# Patient Record
Sex: FEMALE | Race: WHITE | Employment: FULL TIME | URBAN - METROPOLITAN AREA
[De-identification: names, ages, dates, MRNs, and addresses within clinical notes are randomized per-mention and may not be internally consistent; named-entity substitution may affect disease eponyms.]

---

## 2017-05-17 ENCOUNTER — HOSPITAL ENCOUNTER (EMERGENCY)
Facility: HOSPITAL | Age: 47
Discharge: HOME | End: 2017-05-17
Attending: EMERGENCY MEDICINE
Payer: COMMERCIAL

## 2017-05-17 VITALS
BODY MASS INDEX: 23.9 KG/M2 | OXYGEN SATURATION: 97 % | WEIGHT: 140 LBS | SYSTOLIC BLOOD PRESSURE: 148 MMHG | TEMPERATURE: 98 F | HEART RATE: 67 BPM | DIASTOLIC BLOOD PRESSURE: 108 MMHG | RESPIRATION RATE: 16 BRPM | HEIGHT: 64 IN

## 2017-05-17 DIAGNOSIS — I10 ESSENTIAL HYPERTENSION: Primary | ICD-10-CM

## 2017-05-17 PROCEDURE — 99282 EMERGENCY DEPT VISIT SF MDM: CPT

## 2017-05-17 PROCEDURE — 99283 EMERGENCY DEPT VISIT LOW MDM: CPT

## 2017-05-17 RX ORDER — METOPROLOL SUCCINATE 25 MG/1
25 TABLET, EXTENDED RELEASE ORAL DAILY
COMMUNITY
End: 2019-01-03

## 2017-05-17 NOTE — ED INITIAL ASSESSMENT (HPI)
Patient reports she had dental work done 3 day ago, noted high BP at that time. Reports she has been checking it since and remains high  Patient has a hx of htn, is on metoprolol daily, yesterday and today the patient took her medication twice a day.   Carine Obrien

## 2017-05-18 NOTE — ED PROVIDER NOTES
Patient Seen in: [facility] Emergency Department    History   Patient presents with:  Hypertension (cardiovascular)    Stated Complaint: htn    HPI    46-year-old female with history of anxiety, hypertension presents to the emergency department for TABS,  1 TABLET DAILY   HYDROcodone-acetaminophen (NORCO) 5-325 MG Oral Tab,  Take 1-2 tablets by mouth every 4 (four) hours as needed.    Desoximetasone 0.25 % Apply Externally Cream,  Apply to AA of body BID x 2 weeks then PRN       No family history on f touch.  Good skin turgor. No lacerations, abrasions, lesions noted. ED Course   Labs Reviewed - No data to display  Patient clinically appears well. Her blood pressure currently is 730M systolic.   I have advised her to follow-up with her primary care

## 2020-04-07 ENCOUNTER — TELEMEDICINE (OUTPATIENT)
Dept: TELEHEALTH | Age: 50
End: 2020-04-07

## 2020-04-07 DIAGNOSIS — B34.9 VIRAL SYNDROME: Primary | ICD-10-CM

## 2020-04-07 PROCEDURE — 99213 OFFICE O/P EST LOW 20 MIN: CPT | Performed by: FAMILY MEDICINE

## 2020-04-07 NOTE — PROGRESS NOTES
HPI:    Patient ID: Lurdes Novoa is a 54year old female. HPI     About 10 days ago sore throat then started fever bodyaches, headaches and very tired. Beena Seymour   [facility]  She still has a fever but not sure how much   pa discussed about temporarily stopping lisinopril and starting some new medication. toprol sent in    No orders of the defined types were placed in this encounter.       Meds This Visit:  Requested Prescriptions      No prescriptions requested or ordered in t

## 2020-11-27 RX ORDER — METOPROLOL SUCCINATE 50 MG/1
50 TABLET, EXTENDED RELEASE ORAL DAILY
Qty: 20 TABLET | Refills: 0 | Status: SHIPPED | OUTPATIENT
Start: 2020-11-27 | End: 2021-11-22

## 2023-01-19 ENCOUNTER — HOSPITAL ENCOUNTER (OUTPATIENT)
Dept: CT IMAGING | Age: 53
Discharge: HOME | End: 2023-01-19
Attending: INTERNAL MEDICINE

## 2023-01-19 DIAGNOSIS — Z13.6 SCREENING FOR HEART DISEASE: ICD-10-CM

## 2023-01-19 LAB
POCT GLUCOSE CHOLESTECH: 91 (ref 70–99)
POCT HDL: 64 (ref 55–80)
POCT LDL: 167 (ref 0–99)
POCT TOTAL CHOLESTEROL: 259 (ref 110–200)
POCT TRIGLYCERIDES: 142 (ref 1–149)

## 2023-01-19 NOTE — PROGRESS NOTES
Date of Service 1/19/2023    Sergio Moore  Date of Birth 8/21/1970    Patient Age: 50year old    PCP: Kaleb [address]  [address] [place] [id number]    Consult Type  Type Scan/Screening: Heart Scan  Preliminary Heart Scan Score: 0                Body Mass Index  There is no height or weight on file to calculate BMI. Lipid Profile  No Lipid results on file in last 5 years . Nurse Review  Risk factor information and results reviewed with Nurse: Yes    Recommended Follow Up:  Consult your physician regarding[de-identified] Final Heart Scan Report; Discuss potential for Incidental Finding    No data recorded      Recommendations for Change:  Nutrition Changes: Low Saturated Fat;Low Fat Dairy; Increase Fiber  Cholesterol Modification (goal of therapy depends upon your risk): Decrease LDL (Lousy/Bad) Ideal <100  Exercise: Enhance Current Program  Smoking Cessation: No Change Needed  Weight Management: Decrease Current Weight  Stress Management: Adopt Stress Management Techniques  Repeat Heart Scan: 5 years if Calcium Score is 0. 0; Discuss with your Physician          Kin Recommended Resources:  Recommended Resources: Upcoming Classes, Medical Services and Health Library www. Health. Lizzy Coker RN        Please Contact the Nurse Heart Line with any Questions or Concerns [phone].

## 2023-01-19 NOTE — PROGRESS NOTES
Date of Service 1/19/2023    Marizol Granger  Date of Birth 8/21/1970    Patient Age: 50year old    PCP: [address]  [place] [id number]    Consult Type  Type Scan/Screening: Heart Scan  Preliminary Heart Scan Score: 0                Body Mass Index  There is no height or weight on file to calculate BMI. Lipid Profile  Cholesterol: 259, done on 1/19/2023. HDL Cholesterol: 64, done on 1/19/2023. LDL Cholesterol: 167, done on 1/19/2023. TriGlycerides 142, done on 1/19/2023. Not on cholesterol med. Nurse Review  Risk factor information and results reviewed with Nurse: Yes     She is on 3 BP meds. Strong family history of CAD. Both parents had Bypass surgery in their 52's and father has had multiple stents. Younger brother, no heart history. Recommended Follow Up:  Consult your physician regarding[de-identified] Final Heart Scan Report; Discuss potential for Incidental Finding    No data recorded      Recommendations for Change:  Nutrition Changes: Low Saturated Fat;Low Fat Dairy; Increase Fiber  Cholesterol Modification (goal of therapy depends upon your risk): Decrease LDL (Lousy/Bad) Ideal <100  Exercise: Enhance Current Program  Smoking Cessation: No Change Needed  Weight Management: Decrease Current Weight  Stress Management: Adopt Stress Management Techniques  Repeat Heart Scan: 5 years if Calcium Score is 0. 0; Discuss with your Physician          Kin Recommended Resources:  Recommended Resources: Upcoming Classes, Medical Services and Health Library www. "[email or web address]"Health. Steven Pelletier RN        Please Contact the Nurse Heart Line with any Questions or Concerns [phone].

## 2023-01-19 NOTE — PROGRESS NOTES
Date of Service 1/19/2023    Pedro Yu  Date of Birth 8/21/1970    Patient Age: 50year old    PCP: [address]  [address] [place] [id number]    Consult Type  Type Scan/Screening: Heart Scan  Preliminary Heart Scan Score: 0                Body Mass Index  There is no height or weight on file to calculate BMI. Lipid Profile  No Lipid results on file in last 5 years . Today      Nurse Review  Risk factor information and results reviewed with Nurse: Yes    Recommended Follow Up:  Consult your physician regarding[de-identified] Final Heart Scan Report; Discuss potential for Incidental Finding    No data recorded      Recommendations for Change:  Nutrition Changes: Low Saturated Fat;Low Fat Dairy; Increase Fiber  Cholesterol Modification (goal of therapy depends upon your risk): Decrease LDL (Lousy/Bad) Ideal <100  Exercise: Enhance Current Program  Smoking Cessation: No Change Needed  Weight Management: Decrease Current Weight  Stress Management: Adopt Stress Management Techniques  Repeat Heart Scan: 5 years if Calcium Score is 0. 0; Discuss with your Physician          Kin Recommended Resources:  Recommended Resources: Upcoming Classes, Medical Services and Health Library www. [employer]. Eldon Maloney RN        Please Contact the Nurse Heart Line with any Questions or Concerns [phone].

## 2024-08-11 ENCOUNTER — HOSPITAL ENCOUNTER (OUTPATIENT)
Age: 54
Discharge: HOME | End: 2024-08-11
Payer: COMMERCIAL

## 2024-08-11 VITALS
HEIGHT: 64 IN | RESPIRATION RATE: 18 BRPM | TEMPERATURE: 98 F | WEIGHT: 125 LBS | DIASTOLIC BLOOD PRESSURE: 105 MMHG | HEART RATE: 97 BPM | BODY MASS INDEX: 21.34 KG/M2 | OXYGEN SATURATION: 98 % | SYSTOLIC BLOOD PRESSURE: 172 MMHG

## 2024-08-11 DIAGNOSIS — N89.8 VAGINAL IRRITATION: ICD-10-CM

## 2024-08-11 DIAGNOSIS — N30.01 ACUTE CYSTITIS WITH HEMATURIA: Primary | ICD-10-CM

## 2024-08-11 LAB
BILIRUB UR QL STRIP: NEGATIVE
COLOR UR: YELLOW
GLUCOSE UR STRIP-MCNC: NEGATIVE MG/DL
KETONES UR STRIP-MCNC: NEGATIVE MG/DL
NITRITE UR QL STRIP: POSITIVE
PH UR STRIP: 7.5 [PH]
PROT UR STRIP-MCNC: 100 MG/DL
SP GR UR STRIP: 1.02
UROBILINOGEN UR STRIP-ACNC: <2 MG/DL

## 2024-08-11 PROCEDURE — 81514 NFCT DS BV&VAGINITIS DNA ALG: CPT | Performed by: PHYSICIAN ASSISTANT

## 2024-08-11 PROCEDURE — 87086 URINE CULTURE/COLONY COUNT: CPT | Performed by: PHYSICIAN ASSISTANT

## 2024-08-11 PROCEDURE — 87077 CULTURE AEROBIC IDENTIFY: CPT | Performed by: PHYSICIAN ASSISTANT

## 2024-08-11 RX ORDER — NITROFURANTOIN 25; 75 MG/1; MG/1
100 CAPSULE ORAL 2 TIMES DAILY
Qty: 10 CAPSULE | Refills: 0 | Status: SHIPPED | OUTPATIENT
Start: 2024-08-11 | End: 2024-08-16

## 2024-08-11 NOTE — ED PROVIDER NOTES
Patient Seen in: [facility]      History     Chief Complaint   Patient presents with    Eval-G     Stated Complaint: urinary symp    Subjective:   HPI    53-year-old female presents to immediate care for evaluation of dysuria, urinary frequency and urgency starting yesterday. Denies gross hematuria. She reports history of UTIs and recent symptoms feel similar.  She also endorses vaginal dryness and irritation starting several weeks ago.  She initially thought this discomfort was from a yeast infection after being on antibiotics for skin infection at the chin after liposuction.  Took Diflucan then over-the-counter yeast infection medication which did not provide significant relief.  She denies abnormal vaginal discharge or STI concern.  She states she is perimenopausal and is aware that vaginal dryness may be related to this.      Objective:   Past Medical History:    Anemia    Anxiety    Essential hypertension    Herpes simplex    Recurrent UTI              Past Surgical History:   Procedure Laterality Date    Appendectomy      Appendectomy  2016    Cystourethroscopy  2000    D & c  2016    Exc skin benig 2.1-3cm face,facial  9/4/09    Performed by MING NOONAN at [facility], [facility]    Hysterectomy  2016    Robotic    Hysteroscopy  2016    Layr clos wnd face,facial 2.5-5  9/4/09    Performed by MING NOONAN at [facility], [facility]    Needle biopsy right  06/2011    neg    Needle biopsy right  02/2012    neg                Social History     Socioeconomic History    Marital status:    Tobacco Use    Smoking status: Former    Smokeless tobacco: Never   Substance and Sexual Activity    Alcohol use: Yes    Drug use: No              Review of Systems    Positive for stated Chief Complaint: Eval-G    Other systems are as noted in HPI.  Constitutional and vital signs reviewed.      All other systems reviewed and negative except as noted above.    Physical Exam     ED  Triage Vitals [08/11/24 1257]   BP (!) 158/113   Pulse 97   Resp 18   Temp 98.4 °F (36.9 °C)   Temp src Temporal   SpO2 98 %   O2 Device None (Room air)       Current Vitals:   Vital Signs  BP: (!) 172/105  Pulse: 97  Resp: 18  Temp: 98.4 °F (36.9 °C)  Temp src: Temporal    Oxygen Therapy  SpO2: 98 %  O2 Device: None (Room air)            Physical Exam  Vitals and nursing note reviewed. Exam conducted with a chaperone present (ELSI Green).   Constitutional:       General: She is not in acute distress.     Appearance: Normal appearance. She is not ill-appearing, toxic-appearing or diaphoretic.   Cardiovascular:      Rate and Rhythm: Normal rate.   Pulmonary:      Effort: Pulmonary effort is normal. No respiratory distress.   Abdominal:      Palpations: Abdomen is soft.      Tenderness: There is no abdominal tenderness. There is no guarding.   Genitourinary:     General: Normal vulva.      Comments: Scant clear discharge at the vaginal vault  Neurological:      Mental Status: She is alert and oriented to person, place, and time.   Psychiatric:         Mood and Affect: Mood normal.         Behavior: Behavior normal.         ED Course     Labs Reviewed   [facility] POCT URINALYSIS DIPSTICK - Abnormal; Notable for the following components:       Result Value    Urine Clarity Cloudy (*)     Protein urine 100 (*)     Blood, Urine Small (*)     Nitrite Urine Positive (*)     Leukocyte esterase urine Moderate (*)     All other components within normal limits   URINE CULTURE, ROUTINE   VAGINITIS VAGINOSIS PCR PANEL       MDM      Differential diagnosis considered but not limited to vaginitis, acute cystitis, less likely pyelonephritis    Pelvic exam reveals scant clear discharge at the vaginal vault; this may be physiologic.  Vaginitis panel pending.  UA remarkable for moderate leukocyte esterase, positive nitrites, small blood, protein.  Macrobid prescribed for acute cystitis.  Urine bacterial culture pending.  Change antibiotic  based on sensitivities if necessary.  Patient is in agreement to continue follow-up with her gynecologist if vaginitis panel is negative and symptoms persist.        Medical Decision Making  Amount and/or Complexity of Data Reviewed  Labs: ordered. Decision-making details documented in ED Course.    Risk  Prescription drug management.        Disposition and Plan     Clinical Impression:  1. Acute cystitis with hematuria    2. Vaginal irritation         Disposition:  Discharge  8/11/2024  1:32 pm    Follow-up:  [facility]mediate Care [address] [address]  [phone]    If symptoms worsen          Medications Prescribed:  Discharge Medication List as of 8/11/2024  1:33 PM        START taking these medications    Details   nitrofurantoin monohydrate macro 100 MG Oral Cap Take 1 capsule (100 mg total) by mouth 2 (two) times daily for 5 days., Normal, Disp-10 capsule, R-0

## 2024-08-12 LAB
BV BACTERIA DNA VAG QL NAA+PROBE: NEGATIVE
C GLABRATA DNA VAG QL NAA+PROBE: NEGATIVE
C KRUSEI DNA VAG QL NAA+PROBE: NEGATIVE
CANDIDA DNA VAG QL NAA+PROBE: NEGATIVE
T VAGINALIS DNA VAG QL NAA+PROBE: NEGATIVE

## 2025-05-08 ENCOUNTER — HOSPITAL ENCOUNTER (OUTPATIENT)
Age: 55
Discharge: HOME | End: 2025-05-08
Payer: COMMERCIAL

## 2025-05-08 VITALS
DIASTOLIC BLOOD PRESSURE: 102 MMHG | HEIGHT: 64 IN | SYSTOLIC BLOOD PRESSURE: 150 MMHG | TEMPERATURE: 98 F | WEIGHT: 130 LBS | HEART RATE: 96 BPM | RESPIRATION RATE: 18 BRPM | OXYGEN SATURATION: 99 % | BODY MASS INDEX: 22.2 KG/M2

## 2025-05-08 DIAGNOSIS — B37.31 VAGINAL YEAST INFECTION: Primary | ICD-10-CM

## 2025-05-08 LAB
BILIRUB UR QL STRIP: NEGATIVE
CLARITY UR: CLEAR
COLOR UR: YELLOW
GLUCOSE UR STRIP-MCNC: NEGATIVE MG/DL
HGB UR QL STRIP: NEGATIVE
LEUKOCYTE ESTERASE UR QL STRIP: NEGATIVE
NITRITE UR QL STRIP: NEGATIVE
PH UR STRIP: 5.5 [PH]
PROT UR STRIP-MCNC: NEGATIVE MG/DL
SP GR UR STRIP: 1.01
UROBILINOGEN UR STRIP-ACNC: <2 MG/DL

## 2025-05-08 PROCEDURE — 99213 OFFICE O/P EST LOW 20 MIN: CPT | Performed by: PHYSICIAN ASSISTANT

## 2025-05-08 PROCEDURE — 81002 URINALYSIS NONAUTO W/O SCOPE: CPT | Performed by: PHYSICIAN ASSISTANT

## 2025-05-08 RX ORDER — FLUCONAZOLE 150 MG/1
150 TABLET ORAL ONCE
Qty: 1 TABLET | Refills: 1 | Status: SHIPPED | OUTPATIENT
Start: 2025-05-08 | End: 2025-05-08

## 2025-05-08 NOTE — ED PROVIDER NOTES
Patient Seen in: [facility]mediate Care [facility]      History     Chief Complaint   Patient presents with    Urinary Symptoms     Stated Complaint: urinary symp    Subjective:   HPI    Patient is a 54-year-old female that presents to immediate care due to vaginal irritation and dysuria over the past few days.  Patient has attempted to use Monistat over-the-counter with mild relief.  Patient states that she recently completed a 1 week course of Keflex for a face infection that is now resolved.  Denies STI exposure.  History of Present Illness               Objective:     Past Medical History:    Anemia    Anxiety    Essential hypertension    Herpes simplex    Recurrent UTI              Past Surgical History:   Procedure Laterality Date    Appendectomy      Appendectomy  2016    Cystourethroscopy  2000    D & c  2016    Exc skin benig 2.1-3cm face,facial  9/4/09    Performed by MING NOONAN at [facility]    Hysterectomy  2016    Robotic    Hysteroscopy  2016    Layr clos wnd face,facial 2.5-5  9/4/09    Performed by MING NOONAN at [facility]    Needle biopsy right  06/2011    neg    Needle biopsy right  02/2012    neg                Social History     Socioeconomic History    Marital status:    Tobacco Use    Smoking status: Former    Smokeless tobacco: Never   Substance and Sexual Activity    Alcohol use: Yes    Drug use: No              Review of Systems    Positive for stated complaint: urinary symp  Other systems are as noted in HPI.  Constitutional and vital signs reviewed.      All other systems reviewed and negative except as noted above.                  Physical Exam     ED Triage Vitals [05/08/25 1620]   BP (!) 150/102   Pulse 96   Resp 18   Temp 98 °F (36.7 °C)   Temp src Oral   SpO2 99 %   O2 Device None (Room air)       Current Vitals:   Vital Signs  BP: (!) 150/102  Pulse: 96  Resp: 18  Temp: 98 °F (36.7 °C)  Temp src: Oral    Oxygen Therapy  SpO2: 99  %  O2 Device: None (Room air)        Physical Exam  Vital signs reviewed. Nursing note reviewed.  Constitutional: Well-developed. Well-nourished. In no acute distress  HENT: Mucous membranes moist.   EYES: No scleral icterus or conjunctival injection.  NECK: Full ROM. Supple.   CARDIAC: Normal rate. Normal S1/ S2.   PULM/CHEST: No wheezes  ABD: Soft, non-tender, non-distended.   : No CVA tenderness.  RECTAL: deferred  Extremities: Full ROM  NEURO: Awake, alert, following commands, moving extremities, answering questions.   SKIN: Warm and dry. No rash or lesions.  PSYCH: Normal judgment. Normal affect.      Physical Exam                ED Course     Labs Reviewed   H POCT URINALYSIS DIPSTICK - Abnormal; Notable for the following components:       Result Value    Ketone, Urine Trace (*)     All other components within normal limits          Results                           MDM      Patient is a 54-year-old female who presents to immediate care due to vaginal irritation and itching and dysuria over the past few days.  Patient arrives stable vitals.  Physical exam unremarkable soft abdomen and no CVA tenderness.  Unlikely UTI, pyelonephritis as patient recently finished course of Keflex antibiotics.  Urine dip negative for infection today in clinic.  Considered sending for culture however unlikely UTI. patient additionally declined urine culture at this time.  Most likely vaginal yeast infection after recent completion of antibiotics.  Will prescribe Diflucan to pharmacy.  Less likely STIs as patient denies known exposure.  History given by patient.        Medical Decision Making      Disposition and Plan     Clinical Impression:  1. Vaginal yeast infection         Disposition:  Discharge  5/8/2025  4:39 pm    Follow-up:  Socorro Collins DO  [address]  [facility] [address] [phone]    Call             Medications Prescribed:  Discharge Medication List as of 5/8/2025  4:39 PM        START taking  these medications    Details   fluconazole (DIFLUCAN) 150 MG Oral Tab Take 1 tablet (150 mg total) by mouth once for 1 dose., Normal, Disp-1 tablet, R-1             Supplementary Documentation:

## 2025-05-08 NOTE — ED INITIAL ASSESSMENT (HPI)
Finished Keflex for eye infection.    Reports suprapubic pressure, vaginal irritation, urgency x1 week.

## (undated) NOTE — ED AVS SNAPSHOT
[facility] Emergency Department    [facility]  [address] [address]    Phone:  [phone]    Fax:  Conner De La Rosa   MRN: [id number]    Department:  [facility] Emergency Department   Date of Visi IF THERE IS ANY CHANGE OR WORSENING OF YOUR CONDITION, CALL YOUR PRIMARY CARE PHYSICIAN AT ONCE OR RETURN IMMEDIATELY TO THE EMERGENCY DEPARTMENT.     If you have been prescribed any medication(s), please fill your prescription right away and begin taking t

## (undated) NOTE — ED AVS SNAPSHOT
[facility] Emergency Department    [facility]  [address] [address]    Phone:  [phone]    Fax:  Conner De La Rosa   MRN: [id number]    Department:  [facility] Emergency Department   Date of Visi nuestro adminstrador de patsy siddiqui [phone]    Expect to receive an electronic request (by e-mail or text) to complete a self-assessment the day after your visit. You may also receive a call from our patient liason soon after your visit.  Also, some p [facility] [address] ([address]) [address]  ([address]) [address] [facility]. ([address]) [address] [facility] [address] (8 not sign up before the expiration date, you must request a new code. Your unique [employer] Access Code: [email or web address]  Expires: 7/16/2017  7:10 PM    If you have questions, you can call [phone] to talk to our [facility] Staff.  Remember, [employer]